# Patient Record
Sex: FEMALE | URBAN - METROPOLITAN AREA
[De-identification: names, ages, dates, MRNs, and addresses within clinical notes are randomized per-mention and may not be internally consistent; named-entity substitution may affect disease eponyms.]

---

## 2023-10-23 ENCOUNTER — LAB OUTSIDE AN ENCOUNTER (OUTPATIENT)
Dept: URBAN - METROPOLITAN AREA CLINIC 98 | Facility: CLINIC | Age: 38
End: 2023-10-23

## 2023-10-23 ENCOUNTER — OFFICE VISIT (OUTPATIENT)
Dept: URBAN - METROPOLITAN AREA CLINIC 98 | Facility: CLINIC | Age: 38
End: 2023-10-23
Payer: SELF-PAY

## 2023-10-23 ENCOUNTER — DASHBOARD ENCOUNTERS (OUTPATIENT)
Age: 38
End: 2023-10-23

## 2023-10-23 VITALS
DIASTOLIC BLOOD PRESSURE: 94 MMHG | BODY MASS INDEX: 25.92 KG/M2 | TEMPERATURE: 97.2 F | HEIGHT: 59 IN | HEART RATE: 103 BPM | SYSTOLIC BLOOD PRESSURE: 132 MMHG | WEIGHT: 128.6 LBS

## 2023-10-23 DIAGNOSIS — Z12.11 COLON CANCER SCREENING: ICD-10-CM

## 2023-10-23 DIAGNOSIS — R13.19 ESOPHAGEAL DYSPHAGIA: ICD-10-CM

## 2023-10-23 DIAGNOSIS — R10.13 EPIGASTRIC ABDOMINAL PAIN: ICD-10-CM

## 2023-10-23 PROBLEM — 79922009: Status: ACTIVE | Noted: 2023-10-23

## 2023-10-23 PROCEDURE — 99204 OFFICE O/P NEW MOD 45 MIN: CPT | Performed by: INTERNAL MEDICINE

## 2023-10-23 RX ORDER — PANTOPRAZOLE SODIUM 40 MG/1
1 TABLET TABLET, DELAYED RELEASE ORAL
Qty: 90 | Refills: 3 | OUTPATIENT
Start: 2023-10-23

## 2023-10-23 NOTE — HPI-TODAY'S VISIT:
PMH of DMII and HLD She has been having abd pain with radiation to back and rt shoulder Worse at night.  Worse with stress No HP per report form outside physician Also reporting reflux and some dysphagia

## 2023-10-24 LAB
A/G RATIO: 1.1
ABSOLUTE BASOPHILS: 31
ABSOLUTE EOSINOPHILS: 82
ABSOLUTE LYMPHOCYTES: 2958
ABSOLUTE MONOCYTES: 449
ABSOLUTE NEUTROPHILS: 6681
ALBUMIN: 4
ALKALINE PHOSPHATASE: 79
ALT (SGPT): 19
AST (SGOT): 20
BASOPHILS: 0.3
BILIRUBIN, TOTAL: 0.3
BUN/CREATININE RATIO: (no result)
BUN: 9
CALCIUM: 8.7
CARBON DIOXIDE, TOTAL: 21
CHLORIDE: 105
CREATININE: 0.54
EGFR: 121
EOSINOPHILS: 0.8
GLOBULIN, TOTAL: 3.5
GLUCOSE: 127
HEMATOCRIT: 34.9
HEMOGLOBIN: 12.2
LIPASE: 32
LYMPHOCYTES: 29
MCH: 31.4
MCHC: 35
MCV: 89.9
MONOCYTES: 4.4
MPV: 10.2
NEUTROPHILS: 65.5
PLATELET COUNT: 293
POTASSIUM: 4.1
PROTEIN, TOTAL: 7.5
RDW: 12.8
RED BLOOD CELL COUNT: 3.88
SODIUM: 136
WHITE BLOOD CELL COUNT: 10.2

## 2023-11-02 ENCOUNTER — OUT OF OFFICE VISIT (OUTPATIENT)
Dept: URBAN - METROPOLITAN AREA SURGERY CENTER 18 | Facility: SURGERY CENTER | Age: 38
End: 2023-11-02
Payer: SELF-PAY

## 2023-11-02 ENCOUNTER — CLAIMS CREATED FROM THE CLAIM WINDOW (OUTPATIENT)
Dept: URBAN - METROPOLITAN AREA CLINIC 4 | Facility: CLINIC | Age: 38
End: 2023-11-02
Payer: SELF-PAY

## 2023-11-02 DIAGNOSIS — K31.89 MUCOSAL ABNORMALITY OF STOMACH: ICD-10-CM

## 2023-11-02 DIAGNOSIS — R10.13 ABDOMINAL DISCOMFORT, EPIGASTRIC: ICD-10-CM

## 2023-11-02 DIAGNOSIS — K29.80 DUODENITIS WITHOUT BLEEDING: ICD-10-CM

## 2023-11-02 DIAGNOSIS — K31.89 OTHER DISEASES OF STOMACH AND DUODENUM: ICD-10-CM

## 2023-11-02 DIAGNOSIS — K29.80 INFLAMMATION PRESENT ON BIOPSY OF DUODENUM: ICD-10-CM

## 2023-11-02 PROCEDURE — 88305 TISSUE EXAM BY PATHOLOGIST: CPT | Performed by: PATHOLOGY

## 2023-11-02 PROCEDURE — G8907 PT DOC NO EVENTS ON DISCHARG: HCPCS | Performed by: INTERNAL MEDICINE

## 2023-11-02 PROCEDURE — 00731 ANES UPR GI NDSC PX NOS: CPT | Performed by: NURSE ANESTHETIST, CERTIFIED REGISTERED

## 2023-11-02 PROCEDURE — 88312 SPECIAL STAINS GROUP 1: CPT | Performed by: PATHOLOGY

## 2023-11-02 PROCEDURE — 43239 EGD BIOPSY SINGLE/MULTIPLE: CPT | Performed by: INTERNAL MEDICINE

## 2023-12-04 ENCOUNTER — TELEPHONE ENCOUNTER (OUTPATIENT)
Dept: URBAN - METROPOLITAN AREA CLINIC 98 | Facility: CLINIC | Age: 38
End: 2023-12-04

## 2023-12-19 ENCOUNTER — TELEPHONE ENCOUNTER (OUTPATIENT)
Dept: URBAN - METROPOLITAN AREA CLINIC 98 | Facility: CLINIC | Age: 38
End: 2023-12-19

## 2024-01-03 ENCOUNTER — LAB OUTSIDE AN ENCOUNTER (OUTPATIENT)
Dept: URBAN - METROPOLITAN AREA CLINIC 98 | Facility: CLINIC | Age: 39
End: 2024-01-03